# Patient Record
Sex: MALE | Race: WHITE | NOT HISPANIC OR LATINO | Employment: FULL TIME | URBAN - METROPOLITAN AREA
[De-identification: names, ages, dates, MRNs, and addresses within clinical notes are randomized per-mention and may not be internally consistent; named-entity substitution may affect disease eponyms.]

---

## 2024-03-13 ENCOUNTER — APPOINTMENT (OUTPATIENT)
Dept: CT IMAGING | Facility: CLINIC | Age: 64
End: 2024-03-13
Payer: COMMERCIAL

## 2024-03-13 ENCOUNTER — HOSPITAL ENCOUNTER (EMERGENCY)
Facility: CLINIC | Age: 64
Discharge: HOME | End: 2024-03-13
Attending: EMERGENCY MEDICINE | Admitting: EMERGENCY MEDICINE
Payer: COMMERCIAL

## 2024-03-13 VITALS
HEIGHT: 67 IN | RESPIRATION RATE: 18 BRPM | TEMPERATURE: 98.1 F | BODY MASS INDEX: 22.29 KG/M2 | SYSTOLIC BLOOD PRESSURE: 170 MMHG | WEIGHT: 142 LBS | OXYGEN SATURATION: 99 % | DIASTOLIC BLOOD PRESSURE: 106 MMHG | HEART RATE: 108 BPM

## 2024-03-13 DIAGNOSIS — K85.30 DRUG-INDUCED ACUTE PANCREATITIS, UNSPECIFIED COMPLICATION STATUS: ICD-10-CM

## 2024-03-13 LAB
ALBUMIN SERPL BCG-MCNC: 4 G/DL (ref 3.5–5.2)
ALP SERPL-CCNC: 60 U/L (ref 40–150)
ALT SERPL W P-5'-P-CCNC: 5 U/L (ref 0–70)
ANION GAP SERPL CALCULATED.3IONS-SCNC: 9 MMOL/L (ref 7–15)
AST SERPL W P-5'-P-CCNC: 14 U/L (ref 0–45)
BASOPHILS # BLD AUTO: 0 10E3/UL (ref 0–0.2)
BASOPHILS NFR BLD AUTO: 0 %
BILIRUB DIRECT SERPL-MCNC: 0.21 MG/DL (ref 0–0.3)
BILIRUB SERPL-MCNC: 0.9 MG/DL
BUN SERPL-MCNC: 20.9 MG/DL (ref 8–23)
CALCIUM SERPL-MCNC: 9.4 MG/DL (ref 8.8–10.2)
CHLORIDE SERPL-SCNC: 104 MMOL/L (ref 98–107)
CREAT SERPL-MCNC: 0.98 MG/DL (ref 0.67–1.17)
CRP SERPL-MCNC: 12.7 MG/L
DEPRECATED HCO3 PLAS-SCNC: 29 MMOL/L (ref 22–29)
EGFRCR SERPLBLD CKD-EPI 2021: 86 ML/MIN/1.73M2
EOSINOPHIL # BLD AUTO: 0.3 10E3/UL (ref 0–0.7)
EOSINOPHIL NFR BLD AUTO: 3 %
ERYTHROCYTE [DISTWIDTH] IN BLOOD BY AUTOMATED COUNT: 13.1 % (ref 10–15)
GLUCOSE SERPL-MCNC: 101 MG/DL (ref 70–99)
HCT VFR BLD AUTO: 45.3 % (ref 40–53)
HGB BLD-MCNC: 15.3 G/DL (ref 13.3–17.7)
IMM GRANULOCYTES # BLD: 0.1 10E3/UL
IMM GRANULOCYTES NFR BLD: 1 %
LIPASE SERPL-CCNC: 800 U/L (ref 13–60)
LYMPHOCYTES # BLD AUTO: 1.3 10E3/UL (ref 0–5.3)
LYMPHOCYTES NFR BLD AUTO: 13 %
MCH RBC QN AUTO: 29.8 PG (ref 26.5–33)
MCHC RBC AUTO-ENTMCNC: 33.8 G/DL (ref 31.5–36.5)
MCV RBC AUTO: 88 FL (ref 78–100)
MONOCYTES # BLD AUTO: 0.8 10E3/UL (ref 0–1.3)
MONOCYTES NFR BLD AUTO: 8 %
NEUTROPHILS # BLD AUTO: 7.7 10E3/UL (ref 1.6–8.3)
NEUTROPHILS NFR BLD AUTO: 76 %
NRBC # BLD AUTO: 0 10E3/UL
NRBC BLD AUTO-RTO: 0 /100
PLATELET # BLD AUTO: 204 10E3/UL (ref 150–450)
POTASSIUM SERPL-SCNC: 3.8 MMOL/L (ref 3.4–5.3)
PROT SERPL-MCNC: 7.2 G/DL (ref 6.4–8.3)
RBC # BLD AUTO: 5.14 10E6/UL (ref 4.4–5.9)
SODIUM SERPL-SCNC: 142 MMOL/L (ref 135–145)
WBC # BLD AUTO: 10.2 10E3/UL (ref 4–11)

## 2024-03-13 PROCEDURE — 36415 COLL VENOUS BLD VENIPUNCTURE: CPT

## 2024-03-13 PROCEDURE — 80053 COMPREHEN METABOLIC PANEL: CPT

## 2024-03-13 PROCEDURE — 96374 THER/PROPH/DIAG INJ IV PUSH: CPT | Mod: 59

## 2024-03-13 PROCEDURE — 84478 ASSAY OF TRIGLYCERIDES: CPT

## 2024-03-13 PROCEDURE — 250N000011 HC RX IP 250 OP 636

## 2024-03-13 PROCEDURE — 99285 EMERGENCY DEPT VISIT HI MDM: CPT | Mod: 25

## 2024-03-13 PROCEDURE — 96375 TX/PRO/DX INJ NEW DRUG ADDON: CPT

## 2024-03-13 PROCEDURE — 86140 C-REACTIVE PROTEIN: CPT

## 2024-03-13 PROCEDURE — 74177 CT ABD & PELVIS W/CONTRAST: CPT

## 2024-03-13 PROCEDURE — 83690 ASSAY OF LIPASE: CPT

## 2024-03-13 PROCEDURE — 82248 BILIRUBIN DIRECT: CPT

## 2024-03-13 PROCEDURE — 250N000011 HC RX IP 250 OP 636: Performed by: EMERGENCY MEDICINE

## 2024-03-13 PROCEDURE — 85025 COMPLETE CBC W/AUTO DIFF WBC: CPT

## 2024-03-13 RX ORDER — ONDANSETRON 4 MG/1
4 TABLET, ORALLY DISINTEGRATING ORAL EVERY 8 HOURS PRN
Qty: 20 TABLET | Refills: 0 | Status: SHIPPED | OUTPATIENT
Start: 2024-03-13 | End: 2024-03-13

## 2024-03-13 RX ORDER — KETOROLAC TROMETHAMINE 15 MG/ML
15 INJECTION, SOLUTION INTRAMUSCULAR; INTRAVENOUS ONCE
Status: COMPLETED | OUTPATIENT
Start: 2024-03-13 | End: 2024-03-13

## 2024-03-13 RX ORDER — LOPERAMIDE HCL 2 MG
CAPSULE ORAL
COMMUNITY
Start: 2023-02-28

## 2024-03-13 RX ORDER — IOPAMIDOL 755 MG/ML
100 INJECTION, SOLUTION INTRAVASCULAR ONCE
Status: COMPLETED | OUTPATIENT
Start: 2024-03-13 | End: 2024-03-13

## 2024-03-13 RX ORDER — ONDANSETRON 2 MG/ML
4 INJECTION INTRAMUSCULAR; INTRAVENOUS ONCE
Status: COMPLETED | OUTPATIENT
Start: 2024-03-13 | End: 2024-03-13

## 2024-03-13 RX ORDER — CHLORTHALIDONE 25 MG/1
0.5 TABLET ORAL DAILY
COMMUNITY
Start: 2024-03-07

## 2024-03-13 RX ORDER — OXYCODONE HYDROCHLORIDE 5 MG/1
5 TABLET ORAL EVERY 6 HOURS PRN
Qty: 18 TABLET | Refills: 0 | Status: SHIPPED | OUTPATIENT
Start: 2024-03-13

## 2024-03-13 RX ORDER — ONDANSETRON 4 MG/1
4 TABLET, ORALLY DISINTEGRATING ORAL EVERY 8 HOURS PRN
Qty: 20 TABLET | Refills: 0 | Status: SHIPPED | OUTPATIENT
Start: 2024-03-13

## 2024-03-13 RX ORDER — LISINOPRIL 40 MG/1
1 TABLET ORAL DAILY
COMMUNITY
Start: 2024-02-22

## 2024-03-13 RX ADMIN — IOPAMIDOL 90 ML: 755 INJECTION, SOLUTION INTRAVENOUS at 10:19

## 2024-03-13 RX ADMIN — ONDANSETRON 4 MG: 2 INJECTION INTRAMUSCULAR; INTRAVENOUS at 09:44

## 2024-03-13 RX ADMIN — KETOROLAC TROMETHAMINE 15 MG: 15 INJECTION, SOLUTION INTRAMUSCULAR; INTRAVENOUS at 09:39

## 2024-03-13 ASSESSMENT — COLUMBIA-SUICIDE SEVERITY RATING SCALE - C-SSRS
2. HAVE YOU ACTUALLY HAD ANY THOUGHTS OF KILLING YOURSELF IN THE PAST MONTH?: NO
6. HAVE YOU EVER DONE ANYTHING, STARTED TO DO ANYTHING, OR PREPARED TO DO ANYTHING TO END YOUR LIFE?: NO
1. IN THE PAST MONTH, HAVE YOU WISHED YOU WERE DEAD OR WISHED YOU COULD GO TO SLEEP AND NOT WAKE UP?: NO

## 2024-03-13 ASSESSMENT — ACTIVITIES OF DAILY LIVING (ADL)
ADLS_ACUITY_SCORE: 35
ADLS_ACUITY_SCORE: 35

## 2024-03-13 NOTE — ED PROVIDER NOTES
Emergency Department Midlevel Supervisory Note     I had a face to face encounter with this patient seen by the Advanced Practice Provider (KRISTINA). I personally made/approved the management plan and take responsibility for the patient management. I personally saw patient and performed a substantive portion of the visit including all aspects of the medical decision making.     ED Course:  9:39 AM Daphnie Canela PA-C staffed patient with me. I agree with their assessment and plan of management, and I will see the patient.  10:12 AM I met with the patient to introduce myself, gather additional history, perform my initial exam, and discuss the plan.     Brief HPI:     Gerhard Molina is a 63 year old male who presents for evaluation of generalized abdominal pain. Starting taking chlorthalidone on Saturday and had no issues. Takes it every AM and has noticed in the evenings starting Sunday night he has generalized abdominal pain, bloating, pressure that does not radiate anywhere. Worse with talking/deep breaths/movement. He went to sleep and it resolves. Last night it was so severe he could barely walk or move. Did not take anything for the pain. Woke up this AM and driving to the ER/bumps on the road made it worse. No hx of drug reactions to any other medications. No hx abdominal surgeries or other abdominal pathologies/problems. Otherwise takes lisonpril for hypertension but no other medications. Denies hx of GERD/PUD/DM/gastritis/IBD/SBO/ACS/VTE. Denies fevers, chills, nausea, vomiting, diarrhea, constipation, rashes/skin changes, chest pain, SOB, cough, back pain, urinary symptoms. Had a BM this AM which did not resolve the pain. No recent travel/surgery/hospitalizations. Did not take dose of chorthalidone this AM.       IGeronimo, am serving as a scribe to document services personally performed by Cyndie Reeves DO, based on my observations and the provider's statements to me. I, Cyndie Reeves DO,  "attest that Geronimo Jimenes is acting in a scribe capacity, has observed my performance of the services and has documented them in accordance with my direction.     Brief Physical Exam: BP (!) 170/106   Pulse 108   Temp 98.1  F (36.7  C) (Oral)   Resp 18   Ht 1.702 m (5' 7\")   Wt 64.4 kg (142 lb)   SpO2 99%   BMI 22.24 kg/m    Physical Exam  Vitals and nursing note reviewed.   Constitutional:       General: He is not in acute distress.     Appearance: Normal appearance.   HENT:      Head: Normocephalic and atraumatic.   Eyes:      Pupils: Pupils are equal, round, and reactive to light.   Cardiovascular:      Rate and Rhythm: Normal rate and regular rhythm.   Pulmonary:      Effort: Pulmonary effort is normal.   Abdominal:      General: Abdomen is flat.      Tenderness: There is abdominal tenderness in the periumbilical area.   Musculoskeletal:         General: Normal range of motion.   Skin:     General: Skin is warm and dry.   Neurological:      General: No focal deficit present.      Mental Status: He is alert.      Gait: Gait normal.              MDM:  Patient presenting for abdominal pain.  Notes it started when he started chlorthalidone.  No fever.  No diarrhea.  Pain improved with medications here.  Was given IV fluids.  Lab testing notable for elevated lipase, CT imaging shows uncomplicated moderate pancreatitis.  No peripancreatic fluid collection.  No signs of any biliary stones to suggest gallstone pancreatitis.  Patient recently had triglycerides checked and were not acutely elevated.  Patient's pain is well-controlled here.  We discussed admission versus discharge.  Feels comfortable discharge home.  Will have him stop the chlorthalidone.  Discussed symptomatic care and return precautions.       1. Drug-induced acute pancreatitis, unspecified complication status        Labs and Imaging:  Results for orders placed or performed during the hospital encounter of 03/13/24   CT Abdomen Pelvis w Contrast "    Impression    IMPRESSION:   1.  Uncomplicated moderate acute interstitial edematous pancreatitis involving the pancreatic tail and distal body. No peripancreatic fluid collection or pancreatic necrosis.   Basic metabolic panel   Result Value Ref Range    Sodium 142 135 - 145 mmol/L    Potassium 3.8 3.4 - 5.3 mmol/L    Chloride 104 98 - 107 mmol/L    Carbon Dioxide (CO2) 29 22 - 29 mmol/L    Anion Gap 9 7 - 15 mmol/L    Urea Nitrogen 20.9 8.0 - 23.0 mg/dL    Creatinine 0.98 0.67 - 1.17 mg/dL    GFR Estimate 86 >60 mL/min/1.73m2    Calcium 9.4 8.8 - 10.2 mg/dL    Glucose 101 (H) 70 - 99 mg/dL   Hepatic function panel   Result Value Ref Range    Protein Total 7.2 6.4 - 8.3 g/dL    Albumin 4.0 3.5 - 5.2 g/dL    Bilirubin Total 0.9 <=1.2 mg/dL    Alkaline Phosphatase 60 40 - 150 U/L    AST 14 0 - 45 U/L    ALT 5 0 - 70 U/L    Bilirubin Direct 0.21 0.00 - 0.30 mg/dL   Result Value Ref Range    Lipase 800 (H) 13 - 60 U/L   Result Value Ref Range    CRP Inflammation 12.70 (H) <5.00 mg/L   CBC with platelets and differential   Result Value Ref Range    WBC Count 10.2 4.0 - 11.0 10e3/uL    RBC Count 5.14 4.40 - 5.90 10e6/uL    Hemoglobin 15.3 13.3 - 17.7 g/dL    Hematocrit 45.3 40.0 - 53.0 %    MCV 88 78 - 100 fL    MCH 29.8 26.5 - 33.0 pg    MCHC 33.8 31.5 - 36.5 g/dL    RDW 13.1 10.0 - 15.0 %    Platelet Count 204 150 - 450 10e3/uL    % Neutrophils 76 %    % Lymphocytes 13 %    % Monocytes 8 %    % Eosinophils 3 %    % Basophils 0 %    % Immature Granulocytes 1 %    NRBCs per 100 WBC 0 <1 /100    Absolute Neutrophils 7.7 1.6 - 8.3 10e3/uL    Absolute Lymphocytes 1.3 0.0 - 5.3 10e3/uL    Absolute Monocytes 0.8 0.0 - 1.3 10e3/uL    Absolute Eosinophils 0.3 0.0 - 0.7 10e3/uL    Absolute Basophils 0.0 0.0 - 0.2 10e3/uL    Absolute Immature Granulocytes 0.1 <=0.4 10e3/uL    Absolute NRBCs 0.0 10e3/uL       I have reviewed the relevant laboratory studies above.      Procedures:  I was present for the key portions of procedures  documented in KRISTINA/midlevel note, see midlevel note for further details.    Cyndie Reeves DO  [facility] EMERGENCY ROOM  [facility] [id number] [phone]     Cyndie Reeves DO  03/13/24 1440

## 2024-03-13 NOTE — ED TRIAGE NOTES
Began taking Chlorthalidone on Sunday day. With each dose, has been having abd pain shortly afterwards.  This am was worse. Generalized abd pain described as pressure, sharp.  Intensifies with movement or deep inspiration. No history of abd surgeries     Triage Assessment (Adult)       Row Name 03/13/24 0910          Triage Assessment    Airway WDL WDL        Respiratory WDL    Respiratory WDL WDL

## 2024-03-13 NOTE — ED PROVIDER NOTES
Emergency Department Encounter   NAME: Gerhard Molina ; AGE: 63 year old male ; YOB: 1960 ; MRN: [id number] ; PCP: Marianela Oconnor   ED PROVIDER: Daphnie Canela PA-C    Evaluation Date & Time:   3/13/2024  9:12 AM    CHIEF COMPLAINT:  Abdominal Pain      Impression and Plan   Medical Decision Making    Gerhard Molina is a 63 year old male with a PMH hypertension who presents for evaluation of generalized abdominal pain since last night. Vitals positive for HTN at 170/106 and mild tachycardia at 108 but otherwise unremarkable, afebrile. On exam, well appearing in no acute distress laying in the exam bed. Abdomen tender to light palpation with guarding to the RLQ and umbilical regions. Some mild tenderness to palpation in the RUQ and epigastric regions. Cardiopulmonary exam normal.     Differential diagnosis includes pancreatitis, gastritis, PUD, viral gastroenteritis, small bowel obstruction, colitis, appendicitis, cholecystitis, drug reaction. Emergency department work up included lipase, CRP, BMP, hepatic function panel, CBC, CT of the abdomen and pelvis.  Patient was given Toradol and Zofran with complete resolution of symptoms. Work up revealed normal LFTs and bili.  Normal electrolytes and kidney function.  No leukocytosis or anemia.  CRP 12.7.  Lipase 800. CT abdomen pelvis revealed uncomplicated moderate acute interstitial edematous pancreatitis involving the pancreatic tail and distal body. No peripancreatic fluid collection or pancreatic necrosis. Updated patient on results. He denies RUQ pain, gallbladder looks normal on CT, denies alcohol use other than 1 drink on special occasions, triglycerides were normal at visit 1 month ago but will order these again to be sure they are unchanged or not significantly elevated. Discussed with pharmacy. Upon researching this, Chlorthalidone can rarely cause pancreatitis from a few documented case studies. Without other explanation, recommend  that he stop the chorthalidone. Given that the pancreatitis is uncomplicated without signs of fluid collection or necrosis, and his pain was relieved with one dose of toradol and zofran, I do believe that he can be managed in the outpatient setting.  Will send him home with oxycodone and Zofran.  Recommending slowly progressive/transitional diet in the meantime.  Watching for symptoms of worsening condition including cyclical vomiting, fevers, severe pain, or any other concerning symptoms to return to the ER.  Recommend he call/follow-up with his primary to make adjustments on his medications.    Patient was discharged in stable condition but instructed to return to the emergency department with any new or worsening of symptoms. Patient expressed understanding, feels comfortable, and is in agreement with this plan. All questions addressed prior to discharge.    Medical Decision Making  Obtained supplemental history:Supplemental history obtained?: No  Reviewed external records: External records reviewed?: No  Care impacted by chronic illness:Hypertension  Care significantly affected by social determinants of health:N/A  Did you consider but not order tests?: Work up considered but not performed and documented in chart, if applicable  Did you interpret images independently?: Independent interpretation of ECG and images noted in documentation, when applicable.  Consultation discussion with other provider:Did you involve another provider (consultant, MH, pharmacy, etc.)?: No  Discharge. I prescribed additional prescription strength medication(s) as charted. See documentation for any additional details.    ED COURSE:  9:18 AM I met and introduced myself to the patient. I gathered initial history and performed my physical exam. We discussed plan for initial workup.   945 I have staffed the patient with Dr. Cyndie Reeves, ED MD, who has evaluated the patient and agrees with all aspects of today's care.   1100 We discussed  the plan for discharge and the patient is agreeable. Reviewed supportive cares, symptomatic treatment, outpatient follow up, and reasons to return to the Emergency Department. Patient to be discharged by ED RN.           FINAL IMPRESSION:    ICD-10-CM    1. Drug-induced acute pancreatitis, unspecified complication status  K85.30           MEDICATIONS GIVEN IN THE EMERGENCY DEPARTMENT:  Medications   ketorolac (TORADOL) injection 15 mg (15 mg Intravenous $Given 3/13/24 0939)   ondansetron (ZOFRAN) injection 4 mg (4 mg Intravenous $Given 3/13/24 0944)   iopamidol (ISOVUE-370) solution 100 mL (90 mLs Intravenous $Given 3/13/24 1019)       NEW PRESCRIPTIONS STARTED AT TODAY'S ED VISIT:  Discharge Medication List as of 3/13/2024 11:53 AM        START taking these medications    Details   oxyCODONE (ROXICODONE) 5 MG tablet Take 1 tablet (5 mg) by mouth every 6 hours as needed for pain, Disp-18 tablet, R-0, Local Print             HPI   Patient information was obtained from: patient    Use of Intrepreter: N/A     Gerhard Molina is a 63 year old male with a PMH hypertension who presents for evaluation of generalized abdominal pain. Starting taking chlorthalidone on Saturday and had no issues. Takes it every AM and has noticed in the evenings starting Sunday night he has generalized abdominal pain, bloating, pressure that does not radiate anywhere. Worse with talking/deep breaths/movement. He went to sleep and it resolves. Last night it was so severe he could barely walk or move. Did not take anything for the pain. Woke up this AM and driving to the ER/bumps on the road made it worse. No hx of drug reactions to any other medications. No hx abdominal surgeries or other abdominal pathologies/problems. Otherwise takes lisonpril for hypertension but no other medications. Denies hx of GERD/PUD/DM/gastritis/IBD/SBO/ACS/VTE. Denies fevers, chills, nausea, vomiting, diarrhea, constipation, rashes/skin changes, chest pain, SOB,  "cough, back pain, urinary symptoms. Had a BM this AM which did not resolve the pain. No recent travel/surgery/hospitalizations. Did not take dose of chorthalidone this AM.      Medical History     No past medical history on file.    No past surgical history on file.    No family history on file.         chlorthalidone (HYGROTON) 25 MG tablet  lisinopril (ZESTRIL) 40 MG tablet  loperamide (IMODIUM) 2 MG capsule  ondansetron (ZOFRAN ODT) 4 MG ODT tab  oxyCODONE (ROXICODONE) 5 MG tablet        Physical Exam     First Vitals:  Patient Vitals for the past 24 hrs:   BP Temp Temp src Pulse Resp SpO2 Height Weight   03/13/24 0908 (!) 170/106 98.1  F (36.7  C) Oral 108 18 99 % 1.702 m (5' 7\") 64.4 kg (142 lb)       PHYSICAL EXAM:   Physical Exam  Constitutional:       General: He is not in acute distress.     Appearance: He is well-developed. He is not ill-appearing.   Cardiovascular:      Rate and Rhythm: Normal rate and regular rhythm.      Heart sounds: Normal heart sounds.   Pulmonary:      Effort: Pulmonary effort is normal.      Breath sounds: Normal breath sounds.   Abdominal:      General: Abdomen is flat.      Tenderness: There is abdominal tenderness in the right upper quadrant, right lower quadrant, epigastric area and periumbilical area. There is guarding. There is no right CVA tenderness, left CVA tenderness or rebound. Negative signs include Rhodes's sign and McBurney's sign.   Skin:     General: Skin is warm.   Neurological:      General: No focal deficit present.      Mental Status: He is alert.   Psychiatric:         Mood and Affect: Mood normal.           Results     LAB:  All pertinent labs reviewed and interpreted  Labs Ordered and Resulted from Time of ED Arrival to Time of ED Departure   BASIC METABOLIC PANEL - Abnormal       Result Value    Sodium 142      Potassium 3.8      Chloride 104      Carbon Dioxide (CO2) 29      Anion Gap 9      Urea Nitrogen 20.9      Creatinine 0.98      GFR Estimate 86      " Calcium 9.4      Glucose 101 (*)    LIPASE - Abnormal    Lipase 800 (*)    CRP INFLAMMATION - Abnormal    CRP Inflammation 12.70 (*)    HEPATIC FUNCTION PANEL - Normal    Protein Total 7.2      Albumin 4.0      Bilirubin Total 0.9      Alkaline Phosphatase 60      AST 14      ALT 5      Bilirubin Direct 0.21     CBC WITH PLATELETS AND DIFFERENTIAL    WBC Count 10.2      RBC Count 5.14      Hemoglobin 15.3      Hematocrit 45.3      MCV 88      MCH 29.8      MCHC 33.8      RDW 13.1      Platelet Count 204      % Neutrophils 76      % Lymphocytes 13      % Monocytes 8      % Eosinophils 3      % Basophils 0      % Immature Granulocytes 1      NRBCs per 100 WBC 0      Absolute Neutrophils 7.7      Absolute Lymphocytes 1.3      Absolute Monocytes 0.8      Absolute Eosinophils 0.3      Absolute Basophils 0.0      Absolute Immature Granulocytes 0.1      Absolute NRBCs 0.0     TRIGLYCERIDES       RADIOLOGY:  CT Abdomen Pelvis w Contrast   Final Result   IMPRESSION:    1.  Uncomplicated moderate acute interstitial edematous pancreatitis involving the pancreatic tail and distal body. No peripancreatic fluid collection or pancreatic necrosis.                Daphnie Canela PA-C  Emergency Medicine   [facility] EMERGENCY ROOM      Daphnie Canela PA-C  03/13/24 8155

## 2024-03-13 NOTE — Clinical Note
Gerhard Molina was seen and treated in our emergency department on 3/13/2024.  He may return to work on 03/19/2024.       If you have any questions or concerns, please don't hesitate to call.      Daphnie Canela PA-C

## 2024-03-13 NOTE — DISCHARGE INSTRUCTIONS
You are seen in the emergency department for abdominal pain, bloating, and concerns for medication reaction/adverse event.  Your lipase is elevated here which is the enzyme we checked to assess if the pancreas is functioning or inflamed.  Your CT scan confirm that you do have an inflamed pancreas.  Your pain is well-controlled here after nausea and pain medications.  Given that the rest of your vitals are normal here and your pain was well-controlled with pain medications, I do believe that you would be adequate for outpatient management.  I will send you home with a pain medication and nausea medication.  Take these.  Progressively increase your diet from more of a liquid bland diet up to a regular diet over the next couple days.  Watch for signs of worsening symptoms including persistent vomiting, high fevers, diarrhea, severe abdominal pain, or any other concerning symptoms as you may want to come back and be reevaluated.  Please call your doctors office and let them know what we found here today.  We do not see any other indication of what could be causing the pancreatitis including elevated triglycerides, gallstones, alcohol use and you just started this medication so at this point, we do believe it is contributing as there are a few case studies that show it can sometimes be the cause although it is rare.  I sent the Zofran to the pharmacy but the oxycodone is a controlled substance so it was printed here today.

## 2024-03-14 LAB — TRIGL SERPL-MCNC: 65 MG/DL
